# Patient Record
Sex: FEMALE | Race: WHITE | NOT HISPANIC OR LATINO | URBAN - METROPOLITAN AREA
[De-identification: names, ages, dates, MRNs, and addresses within clinical notes are randomized per-mention and may not be internally consistent; named-entity substitution may affect disease eponyms.]

---

## 2017-08-01 ENCOUNTER — TRANSCRIBE ORDERS (OUTPATIENT)
Dept: ENDOCRINOLOGY | Facility: CLINIC | Age: 42
End: 2017-08-01

## 2017-08-01 DIAGNOSIS — E04.9 ENLARGED THYROID GLAND: Primary | ICD-10-CM

## 2018-05-21 ENCOUNTER — OFFICE VISIT (OUTPATIENT)
Dept: ENDOCRINOLOGY | Facility: CLINIC | Age: 43
End: 2018-05-21

## 2018-05-21 VITALS
SYSTOLIC BLOOD PRESSURE: 140 MMHG | HEIGHT: 68 IN | HEART RATE: 68 BPM | BODY MASS INDEX: 24.55 KG/M2 | DIASTOLIC BLOOD PRESSURE: 92 MMHG | WEIGHT: 162 LBS | OXYGEN SATURATION: 99 %

## 2018-05-21 DIAGNOSIS — E06.3 HASHIMOTO'S DISEASE: ICD-10-CM

## 2018-05-21 DIAGNOSIS — E04.1 UNINODULAR GOITER (NONTOXIC): Primary | ICD-10-CM

## 2018-05-21 PROBLEM — E04.9 GOITER: Status: ACTIVE | Noted: 2018-05-21

## 2018-05-21 PROCEDURE — 99244 OFF/OP CNSLTJ NEW/EST MOD 40: CPT | Performed by: INTERNAL MEDICINE

## 2018-05-21 PROCEDURE — 76536 US EXAM OF HEAD AND NECK: CPT | Performed by: INTERNAL MEDICINE

## 2018-05-21 RX ORDER — ETHYNODIOL DIACETATE AND ETHINYL ESTRADIOL 1 MG-35MCG
KIT ORAL
COMMUNITY
Start: 2018-05-02 | End: 2018-05-21

## 2018-05-22 LAB
T4 FREE SERPL-MCNC: 1.18 NG/DL (ref 0.89–1.76)
THYROPEROXIDASE AB SERPL-ACNC: >600 IU/ML (ref 0–34)
TSH SERPL DL<=0.005 MIU/L-ACNC: 3.55 MIU/ML (ref 0.35–5.35)

## 2018-05-24 ENCOUNTER — TELEPHONE (OUTPATIENT)
Dept: ENDOCRINOLOGY | Facility: CLINIC | Age: 43
End: 2018-05-24

## 2018-05-24 NOTE — TELEPHONE ENCOUNTER
Spoke to patient about lab results. See clinic note from 05/21/2018 for details.   Mirlande Weaver MD

## 2018-06-02 PROBLEM — E04.1 UNINODULAR GOITER (NONTOXIC): Status: ACTIVE | Noted: 2018-05-21

## 2018-11-12 ENCOUNTER — OFFICE VISIT (OUTPATIENT)
Dept: ENDOCRINOLOGY | Facility: CLINIC | Age: 43
End: 2018-11-12

## 2018-11-12 VITALS
BODY MASS INDEX: 24.86 KG/M2 | HEART RATE: 75 BPM | DIASTOLIC BLOOD PRESSURE: 94 MMHG | OXYGEN SATURATION: 97 % | HEIGHT: 68 IN | SYSTOLIC BLOOD PRESSURE: 150 MMHG | WEIGHT: 164 LBS

## 2018-11-12 DIAGNOSIS — E06.3 HASHIMOTO'S DISEASE: Primary | ICD-10-CM

## 2018-11-12 DIAGNOSIS — E04.1 UNINODULAR GOITER (NONTOXIC): ICD-10-CM

## 2018-11-12 LAB
T4 FREE SERPL-MCNC: 1.12 NG/DL (ref 0.89–1.76)
TSH SERPL DL<=0.005 MIU/L-ACNC: 3.67 MIU/ML (ref 0.35–5.35)

## 2018-11-12 PROCEDURE — 99213 OFFICE O/P EST LOW 20 MIN: CPT | Performed by: INTERNAL MEDICINE

## 2018-11-12 NOTE — PROGRESS NOTES
"Chief Complaint   Patient presents with   • Uninodular goiter (nontoxic)     f/u        HPI:   Calista Escobar is a 43 y.o.female who returns to [facility]ocrine Clinic for f/u evaluation of her Hashimoto's disease and uninodular goiter. Last visit 05/21/2018.  Her history is as follows:    1) Hashimoto's disease without hypothyroidism as this time:  - labs in May 2018 (TPO Ab > 600) and imaging were consistent early underlying autoimmune thyroid disease (Hashimoto's thyroiditis).   - Pt has not developed overat hypothyroidism at this time, but discussed with patient that she is at risk for developing overt hypothyroidism in the future.     1) uninodular goiter:  - During routine visit for birth control with her gynecologist, pt was found to have a goiter on physical exam. Pt's TSH at that time (06/29/2017) was normal at 1.960 (0.358 - 3.74).  Her gland was further evaluated [facility]. The report described an \"enlarged heterogeneous thyroid gland with two nodules in the right [inferior] lobe.\" A description of the nodules, other than a size of 1.5 cm in one dimension, was not given.    Initial Endocrine Visit: (05/21/2018) Thyroid US performed in clinic showed the thyroid gland was mildly enlarged in size by measured dimensions. The thyroid gland appeared overall slightly hypoechoic with diffusely heterogeneous echotexture. There were multiple small (2-3 mm) cystic lesions scattered throughout the gland; fibrous stranding noted in each lobe; and increased vascularity in each lobe. These US findings represent an early stage of Hashimoto's thyroiditis and correlate with the patient's clinical history. In the right mid to superior lobe, a 1.64(L) x 0.70(AP) x 0.99(T) cm isoechoic, solid nodule was identified. The nodule had a smooth margin, minimal peripheral and intranodal vascularity, and no microcalcifications. Two mildly enlarged lymph nodes in the central compartment, Level VI, " "were identified. The nodes did not have pathologic US features.   RECOMMENDATIONS: The right lobe nodule did not meet criteria for FNA and lacked suspicious US characteristics. Repeat thyroid US recommended to the patient in one year.    On further review, the patient denies a pattern of symptoms consistent with hyper- or hypothyroidism or obstructive goiter. Reports fatigue is no worse. Has poor sleep, wakes up frequently. On average gets  < 6 hrs per night    FMH: no known thyroid cancer, mother has thyroid disease but pt does not know what type.  PMH: Her past medical history is significant for 6 pregnancies with 3 miscarriages.      Review of Systems   Constitutional: Positive for fatigue.   HENT: Negative.    Eyes: Negative.    Respiratory: Negative.    Cardiovascular: Negative.    Gastrointestinal: Negative.  Negative for constipation.   Endocrine: Negative.    Genitourinary: Negative.    Musculoskeletal: Negative.  Negative for arthralgias.   Skin: Negative.    Allergic/Immunologic: Negative.    Neurological: Negative.    Hematological: Negative.    Psychiatric/Behavioral: Positive for sleep disturbance.     The following portions of the patient's history were reviewed and updated as appropriate: allergies, current medications, past family history, past medical history, past social history, past surgical history and problem list.    /94   Pulse 75   Ht 172.7 cm (68\")   Wt 74.4 kg (164 lb)   SpO2 97%   BMI 24.94 kg/m²   Physical Exam   Constitutional: She is oriented to person, place, and time. She appears well-developed. No distress.   HENT:   Head: Normocephalic.   Mouth/Throat: Oropharynx is clear and moist.   Eyes: Conjunctivae and EOM are normal. Pupils are equal, round, and reactive to light.   Neck: No tracheal deviation present. Thyromegaly (mild) present.   No palpable thyroid nodules     Cardiovascular: Normal rate, regular rhythm and normal heart sounds.   No murmur " heard.  Pulmonary/Chest: Effort normal and breath sounds normal. No respiratory distress.   Lymphadenopathy:     She has no cervical adenopathy.   Neurological: She is alert and oriented to person, place, and time. No cranial nerve deficit.   Skin: Skin is warm and dry. She is not diaphoretic. No erythema.   Psychiatric: She has a normal mood and affect. Her behavior is normal.   Vitals reviewed.    LABS/IMAGING: outside records reviewed and summarized in HPI  Results for orders placed or performed in visit on 11/12/18   TSH   Result Value Ref Range    TSH 3.666 0.350 - 5.350 mIU/mL   T4, Free   Result Value Ref Range    Free T4 1.12 0.89 - 1.76 ng/dL       ASSESSMENT/PLAN:  1) Hashimoto's disease without hypothyroidism as this time:  - clinically euthyroid on exam  - labs today show normal thyroid function  - labs today and imaging are consistent early underlying autoimmune thyroid disease (Hashimoto's thyroiditis). Pt has not developed hypothyroidism at this time, but discussed with patient that she is at risk for developing overt hypothyroidism in the future. Therefore, checking a screening TSH periodically is reasonable or sooner if she develops symptoms. Reviewed symptoms of hypothyroidism.    2) uninodular goiter: Thyroid US completed in clinic in 05/2018 (see HPI)   - Repeat thyroid US in clinic scheduled for 05/2019.    Will have pt RTC in 6 months for f/u visit

## 2019-05-20 ENCOUNTER — OFFICE VISIT (OUTPATIENT)
Dept: ENDOCRINOLOGY | Facility: CLINIC | Age: 44
End: 2019-05-20

## 2019-05-20 VITALS
SYSTOLIC BLOOD PRESSURE: 140 MMHG | BODY MASS INDEX: 24.4 KG/M2 | HEART RATE: 63 BPM | WEIGHT: 161 LBS | DIASTOLIC BLOOD PRESSURE: 88 MMHG | HEIGHT: 68 IN | OXYGEN SATURATION: 98 %

## 2019-05-20 DIAGNOSIS — E03.8 HYPOTHYROIDISM DUE TO HASHIMOTO'S THYROIDITIS: ICD-10-CM

## 2019-05-20 DIAGNOSIS — E06.3 HYPOTHYROIDISM DUE TO HASHIMOTO'S THYROIDITIS: ICD-10-CM

## 2019-05-20 DIAGNOSIS — E04.1 UNINODULAR GOITER (NONTOXIC): Primary | ICD-10-CM

## 2019-05-20 PROCEDURE — 99213 OFFICE O/P EST LOW 20 MIN: CPT | Performed by: INTERNAL MEDICINE

## 2019-05-20 PROCEDURE — 76536 US EXAM OF HEAD AND NECK: CPT | Performed by: INTERNAL MEDICINE

## 2019-05-20 RX ORDER — CETIRIZINE HYDROCHLORIDE 10 MG/1
10 TABLET ORAL DAILY
COMMUNITY

## 2019-05-21 LAB
T4 FREE SERPL-MCNC: 0.99 NG/DL (ref 0.93–1.7)
TSH SERPL DL<=0.005 MIU/L-ACNC: 4.53 UIU/ML (ref 0.45–4.5)

## 2019-05-26 ENCOUNTER — TELEPHONE (OUTPATIENT)
Dept: ENDOCRINOLOGY | Facility: CLINIC | Age: 44
End: 2019-05-26

## 2019-05-26 PROBLEM — E03.8 HYPOTHYROIDISM DUE TO HASHIMOTO'S THYROIDITIS: Status: ACTIVE | Noted: 2018-11-12

## 2019-05-26 RX ORDER — LEVOTHYROXINE SODIUM 0.05 MG/1
50 TABLET ORAL DAILY
Qty: 90 TABLET | Refills: 3 | Status: SHIPPED | OUTPATIENT
Start: 2019-05-26 | End: 2020-05-19

## 2019-05-26 NOTE — TELEPHONE ENCOUNTER
Spoke to patient about lab results. See clinic note from 05/20/2019 for details.   Mirlande Weaver MD

## 2020-05-19 DIAGNOSIS — E03.8 HYPOTHYROIDISM DUE TO HASHIMOTO'S THYROIDITIS: ICD-10-CM

## 2020-05-19 DIAGNOSIS — E06.3 HYPOTHYROIDISM DUE TO HASHIMOTO'S THYROIDITIS: ICD-10-CM

## 2020-05-19 RX ORDER — LEVOTHYROXINE SODIUM 0.05 MG/1
TABLET ORAL
Qty: 30 TABLET | Refills: 0 | Status: SHIPPED | OUTPATIENT
Start: 2020-05-19 | End: 2020-06-11 | Stop reason: DRUGHIGH

## 2020-06-01 ENCOUNTER — OFFICE VISIT (OUTPATIENT)
Dept: ENDOCRINOLOGY | Facility: CLINIC | Age: 45
End: 2020-06-01

## 2020-06-01 VITALS
WEIGHT: 161 LBS | HEIGHT: 68 IN | DIASTOLIC BLOOD PRESSURE: 92 MMHG | SYSTOLIC BLOOD PRESSURE: 150 MMHG | BODY MASS INDEX: 24.4 KG/M2 | OXYGEN SATURATION: 99 % | HEART RATE: 72 BPM

## 2020-06-01 DIAGNOSIS — E04.1 UNINODULAR GOITER (NONTOXIC): ICD-10-CM

## 2020-06-01 DIAGNOSIS — E03.8 HYPOTHYROIDISM DUE TO HASHIMOTO'S THYROIDITIS: Primary | ICD-10-CM

## 2020-06-01 DIAGNOSIS — E06.3 HYPOTHYROIDISM DUE TO HASHIMOTO'S THYROIDITIS: Primary | ICD-10-CM

## 2020-06-01 LAB — TSH SERPL DL<=0.005 MIU/L-ACNC: 4.26 UIU/ML (ref 0.27–4.2)

## 2020-06-01 PROCEDURE — 99213 OFFICE O/P EST LOW 20 MIN: CPT | Performed by: INTERNAL MEDICINE

## 2020-06-01 NOTE — PROGRESS NOTES
"Chief Complaint   Patient presents with   • Hypothyroidism due to Hashimotos thyroiditis     f/u   • Uninodular goiter     f/u       HPI:   Calista Escobar is a 44 y.o.female who returns to Endocrine Clinic for f/u evaluation of her Hashimoto's hypothyroidism and uninodular goiter. Last visit 05/20/2019.  Her history is as follows:    Interim Events:   - overall in good health  - reports fatigue did not improve after starting levothyroxine    1) hypothyroidism due to Hashimoto's thyroiditis:  - labs in May 2018 (TPO Ab > 600) and imaging were consistent early underlying autoimmune thyroid disease (Hashimoto's thyroiditis).   - Pt had not developed overt hypothyroidism at that time, but discussed with patient that she is at risk for developing overt hypothyroidism in the future.   - (05/20/2019) TSH 4.530 (0.450 - 4.500): Started levothyroxine 50 mcg    Current Dose: Levothyroxine 50 mcg  -Takes in a.m. on an empty stomach.  At least 30 minutes before food or hot liquids  - Not on a high-dose biotin supplement  - No missed doses    2) uninodular goiter:  - During routine visit for birth control with her gynecologist, pt was found to have a goiter on physical exam. Pt's TSH at that time (06/29/2017) was normal at 1.960 (0.358 - 3.74).  Her gland was further evaluated with Thyroid US completed 07/06/2017 at [facility]. The report described an \"enlarged heterogeneous thyroid gland with two nodules in the right [inferior] lobe.\" A description of the nodules, other than a size of 1.5 cm in one dimension, was not given.    Initial Endocrine Visit: (05/21/2018) Thyroid US performed in clinic showed the thyroid gland was mildly enlarged in size by measured dimensions. The thyroid gland appeared overall slightly hypoechoic with diffusely heterogeneous echotexture. There were multiple small (2-3 mm) cystic lesions scattered throughout the gland; fibrous stranding noted in each lobe; and increased vascularity in each lobe. " These US findings represent an early stage of Hashimoto's thyroiditis and correlate with the patient's clinical history. In the right mid to superior lobe, a 1.64(L) x 0.70(AP) x 0.99(T) cm isoechoic, solid nodule was identified. The nodule had a smooth margin, minimal peripheral and intranodal vascularity, and no microcalcifications. Two mildly enlarged lymph nodes in the central compartment, Level VI, were identified. The nodes did not have pathologic US features.   RECOMMENDATIONS: The right lobe nodule did not meet criteria for FNA and lacked suspicious US characteristics. Repeat thyroid US recommended to the patient in one year.    (5/29/2019) Thyroid ultrasound performed in clinic showed the thyroid gland was mildly enlarged in size by measured dimensions. The thyroid gland appeared overall slightly hypoechoic with diffusely heterogeneous echotexture. There were multiple small (2-3 mm) cystic lesions scattered throughout the gland; fibrous stranding noted in each lobe; and increased vascularity in each lobe. These US findings represent an early stage of Hashimoto's thyroiditis and correlate with the patient's clinical history.  In the right mid to superior lobe, a 1.68(L) x 0.84(AP) x 1.24(T) cm isoechoic, solid nodule was again identified. The nodule had a smooth margin, minimal peripheral and intranodal vascularity, and no microcalcifications..Three mildly enlarged lymph nodes in the central compartment, Level VI, were again identified. The nodes did not have pathologic US features.   RECOMMENDATIONS: The right lobe nodule did not meet criteria for FNA and lacked suspicious US characteristics. Repeat Thyroid US recommended if changes in the gland/neck are noted on physical exam,     FMH: no known thyroid cancer, mother has thyroid disease but pt does not know what type.  PMH: Her past medical history is significant for 6 pregnancies with 3 miscarriages.      Review of Systems   Constitutional: Positive for  "fatigue.   HENT: Negative.    Eyes: Negative.    Respiratory: Negative.    Cardiovascular: Negative.    Gastrointestinal: Negative.  Negative for constipation.   Endocrine: Negative.    Genitourinary: Negative.    Musculoskeletal: Negative.  Negative for arthralgias.   Skin: Negative.    Allergic/Immunologic: Negative.    Neurological: Negative.    Hematological: Negative.    Psychiatric/Behavioral: Negative.  Negative for sleep disturbance.     The following portions of the patient's history were reviewed and updated as appropriate: allergies, current medications, past family history, past medical history, past social history, past surgical history and problem list.    /92   Pulse 72   Ht 172.7 cm (68\")   Wt 73 kg (161 lb)   SpO2 99%   BMI 24.48 kg/m²   Physical Exam   Constitutional: She is oriented to person, place, and time. She appears well-developed. No distress.   HENT:   Head: Normocephalic.   Mouth/Throat: Oropharynx is clear and moist.   Eyes: Pupils are equal, round, and reactive to light. Conjunctivae and EOM are normal.   Neck: No tracheal deviation present. Thyromegaly (mild) present.   No palpable thyroid nodules     Cardiovascular: Normal rate, regular rhythm and normal heart sounds.   No murmur heard.  Pulmonary/Chest: Effort normal and breath sounds normal. No respiratory distress.   Lymphadenopathy:     She has no cervical adenopathy.   Neurological: She is alert and oriented to person, place, and time. No cranial nerve deficit.   Skin: Skin is warm and dry. She is not diaphoretic. No erythema.   Psychiatric: She has a normal mood and affect. Her behavior is normal.   Vitals reviewed.    LABS/IMAGING: outside records reviewed and summarized in HPI  Results for orders placed or performed in visit on 06/01/20   TSH   Result Value Ref Range    TSH 4.260 (H) 0.270 - 4.200 uIU/mL     ASSESSMENT/PLAN:  1) hypothyroidism due to Hashimoto's thyroiditis: uncontrolled  - clinically euthyroid on " exam  - lab today shows an elevated TSH  - will will increase levothyroxine to 88 mcg daily  - Reviewed proper thyroid hormone administration, and factors to avoid that decrease medication potency and medication absorption.   - Will check TFTs in 2 months and adjust dose as indicated    2) uninodular goiter:   - Gland size stable on exam today  - Prior thyroid ultrasound reviewed  - Repeat thyroid ultrasound planned PRN changes in gland on physical exam    Will have pt RTC in 12 months for f/u visit

## 2020-06-11 ENCOUNTER — TELEPHONE (OUTPATIENT)
Dept: ENDOCRINOLOGY | Facility: CLINIC | Age: 45
End: 2020-06-11

## 2020-06-11 DIAGNOSIS — E06.3 HYPOTHYROIDISM DUE TO HASHIMOTO'S THYROIDITIS: Primary | ICD-10-CM

## 2020-06-11 DIAGNOSIS — E03.8 HYPOTHYROIDISM DUE TO HASHIMOTO'S THYROIDITIS: Primary | ICD-10-CM

## 2020-06-11 RX ORDER — LEVOTHYROXINE SODIUM 88 UG/1
88 TABLET ORAL DAILY
Qty: 90 TABLET | Refills: 3 | Status: SHIPPED | OUTPATIENT
Start: 2020-06-11 | End: 2021-06-14

## 2020-06-11 NOTE — TELEPHONE ENCOUNTER
Spoke to patient about lab results. See clinic note from 06/01/2020 for details.   Signed: Mirlande Weaver MD

## 2020-08-03 ENCOUNTER — LAB REQUISITION (OUTPATIENT)
Dept: LAB | Facility: HOSPITAL | Age: 45
End: 2020-08-03

## 2020-08-03 ENCOUNTER — LAB (OUTPATIENT)
Dept: ENDOCRINOLOGY | Facility: CLINIC | Age: 45
End: 2020-08-03

## 2020-08-03 DIAGNOSIS — E03.8 HYPOTHYROIDISM DUE TO HASHIMOTO'S THYROIDITIS: ICD-10-CM

## 2020-08-03 DIAGNOSIS — E06.3 HYPOTHYROIDISM DUE TO HASHIMOTO'S THYROIDITIS: ICD-10-CM

## 2020-08-03 DIAGNOSIS — E06.3 AUTOIMMUNE THYROIDITIS: ICD-10-CM

## 2020-08-03 DIAGNOSIS — E03.8 OTHER SPECIFIED HYPOTHYROIDISM: ICD-10-CM

## 2020-08-03 PROCEDURE — 36415 COLL VENOUS BLD VENIPUNCTURE: CPT | Performed by: INTERNAL MEDICINE

## 2020-08-04 LAB
T4 FREE SERPL-MCNC: 1.51 NG/DL (ref 0.93–1.7)
TSH SERPL DL<=0.005 MIU/L-ACNC: 1.88 UIU/ML (ref 0.27–4.2)

## 2021-06-02 ENCOUNTER — OFFICE VISIT (OUTPATIENT)
Dept: ENDOCRINOLOGY | Facility: CLINIC | Age: 46
End: 2021-06-02

## 2021-06-02 VITALS
OXYGEN SATURATION: 98 % | BODY MASS INDEX: 25.76 KG/M2 | HEART RATE: 70 BPM | SYSTOLIC BLOOD PRESSURE: 124 MMHG | WEIGHT: 170 LBS | DIASTOLIC BLOOD PRESSURE: 70 MMHG | HEIGHT: 68 IN

## 2021-06-02 DIAGNOSIS — E03.8 HYPOTHYROIDISM DUE TO HASHIMOTO'S THYROIDITIS: Primary | ICD-10-CM

## 2021-06-02 DIAGNOSIS — R53.82 CHRONIC FATIGUE: ICD-10-CM

## 2021-06-02 DIAGNOSIS — E06.3 HYPOTHYROIDISM DUE TO HASHIMOTO'S THYROIDITIS: Primary | ICD-10-CM

## 2021-06-02 DIAGNOSIS — E04.1 UNINODULAR GOITER (NONTOXIC): ICD-10-CM

## 2021-06-02 LAB
25(OH)D3+25(OH)D2 SERPL-MCNC: 21.5 NG/ML (ref 30–100)
ERYTHROCYTE [DISTWIDTH] IN BLOOD BY AUTOMATED COUNT: 12.2 % (ref 12.3–15.4)
FERRITIN SERPL-MCNC: 16.2 NG/ML (ref 13–150)
HCT VFR BLD AUTO: 37.2 % (ref 34–46.6)
HGB BLD-MCNC: 12.9 G/DL (ref 12–15.9)
IRON SATN MFR SERPL: 13 % (ref 20–50)
IRON SERPL-MCNC: 67 MCG/DL (ref 37–145)
MCH RBC QN AUTO: 30.6 PG (ref 26.6–33)
MCHC RBC AUTO-ENTMCNC: 34.7 G/DL (ref 31.5–35.7)
MCV RBC AUTO: 88.4 FL (ref 79–97)
PLATELET # BLD AUTO: 323 10*3/MM3 (ref 140–450)
RBC # BLD AUTO: 4.21 10*6/MM3 (ref 3.77–5.28)
TIBC SERPL-MCNC: 516 MCG/DL
TSH SERPL DL<=0.005 MIU/L-ACNC: 1.45 UIU/ML (ref 0.27–4.2)
UIBC SERPL-MCNC: 449 MCG/DL (ref 112–346)
VIT B12 SERPL-MCNC: 221 PG/ML (ref 211–946)
WBC # BLD AUTO: 4.75 10*3/MM3 (ref 3.4–10.8)

## 2021-06-02 PROCEDURE — 99213 OFFICE O/P EST LOW 20 MIN: CPT | Performed by: INTERNAL MEDICINE

## 2021-06-02 NOTE — PROGRESS NOTES
"Chief Complaint   Patient presents with   • Hypothyroidism     follow up       HPI:   Calista Escobar is a 45 y.o.female who returns to Endocrine Clinic for f/u evaluation of her Hashimoto's hypothyroidism and uninodular goiter. Last visit 06/01/2020.  Her history is as follows:    Interim Events:   - overall in good health  - reports chronic fatigue despite adequate sleep  - taking levothyroxine as directed    1) hypothyroidism due to Hashimoto's thyroiditis:  - labs in May 2018 (TPO Ab > 600) and imaging were consistent early underlying autoimmune thyroid disease (Hashimoto's thyroiditis).   - Pt had not developed overt hypothyroidism at that time, but discussed with patient that she is at risk for developing overt hypothyroidism in the future.   - (05/20/2019) TSH 4.530 (0.450 - 4.500): Started levothyroxine 50 mcg. Dose has been titrated    Current Dose: Levothyroxine 88 mcg  -Takes in a.m. on an empty stomach.  At least 30 minutes before food or hot liquids  - Not on a high-dose biotin supplement  - No missed doses    2) uninodular goiter:  - During routine visit for birth control with her gynecologist, pt was found to have a goiter on physical exam. Pt's TSH at that time (06/29/2017) was normal at 1.960 (0.358 - 3.74).  Her gland was further evaluated with Thyroid US completed 07/06/2017 at [facility]. The report described an \"enlarged heterogeneous thyroid gland with two nodules in the right [inferior] lobe.\" A description of the nodules, other than a size of 1.5 cm in one dimension, was not given.    Initial Endocrine Visit: (05/21/2018) Thyroid US performed in clinic showed the thyroid gland was mildly enlarged in size by measured dimensions. The thyroid gland appeared overall slightly hypoechoic with diffusely heterogeneous echotexture. There were multiple small (2-3 mm) cystic lesions scattered throughout the gland; fibrous stranding noted in each lobe; and increased vascularity in each lobe. These US " findings represent an early stage of Hashimoto's thyroiditis and correlate with the patient's clinical history. In the right mid to superior lobe, a 1.64(L) x 0.70(AP) x 0.99(T) cm isoechoic, solid nodule was identified. The nodule had a smooth margin, minimal peripheral and intranodal vascularity, and no microcalcifications. Two mildly enlarged lymph nodes in the central compartment, Level VI, were identified. The nodes did not have pathologic US features.   RECOMMENDATIONS: The right lobe nodule did not meet criteria for FNA and lacked suspicious US characteristics. Repeat thyroid US recommended to the patient in one year.    (5/29/2019) Thyroid ultrasound performed in clinic showed the thyroid gland was mildly enlarged in size by measured dimensions. The thyroid gland appeared overall slightly hypoechoic with diffusely heterogeneous echotexture. There were multiple small (2-3 mm) cystic lesions scattered throughout the gland; fibrous stranding noted in each lobe; and increased vascularity in each lobe. These US findings represent an early stage of Hashimoto's thyroiditis and correlate with the patient's clinical history.  In the right mid to superior lobe, a 1.68(L) x 0.84(AP) x 1.24(T) cm isoechoic, solid nodule was again identified. The nodule had a smooth margin, minimal peripheral and intranodal vascularity, and no microcalcifications..Three mildly enlarged lymph nodes in the central compartment, Level VI, were again identified. The nodes did not have pathologic US features.   RECOMMENDATIONS: The right lobe nodule did not meet criteria for FNA and lacked suspicious US characteristics. Repeat Thyroid US recommended if changes in the gland/neck are noted on physical exam,     FMH: no known thyroid cancer, mother has thyroid disease but pt does not know what type.  PMH: Her past medical history is significant for 6 pregnancies with 3 miscarriages.      Review of Systems   Constitutional: Positive for fatigue.  "  HENT: Negative.    Eyes: Negative.    Respiratory: Negative.    Cardiovascular: Negative.    Gastrointestinal: Negative.  Negative for constipation.   Endocrine: Negative.    Genitourinary: Negative.    Musculoskeletal: Negative.  Negative for arthralgias.   Skin: Negative.    Allergic/Immunologic: Negative.    Neurological: Negative.    Hematological: Negative.    Psychiatric/Behavioral: Negative.  Negative for sleep disturbance.     The following portions of the patient's history were reviewed and updated as appropriate: allergies, current medications, past family history, past medical history, past social history, past surgical history and problem list.    /70   Pulse 70   Ht 172.7 cm (68\")   Wt 77.1 kg (170 lb)   SpO2 98%   BMI 25.85 kg/m²   Physical Exam   Constitutional: She is oriented to person, place, and time. She appears well-developed. No distress.   HENT:   Head: Normocephalic.   Eyes: Pupils are equal, round, and reactive to light. Conjunctivae are normal.   Neck: No tracheal deviation present. Thyromegaly (mild) present.   No palpable thyroid nodules     Cardiovascular: Normal rate, regular rhythm and normal heart sounds.   No murmur heard.  Pulmonary/Chest: Effort normal and breath sounds normal. No respiratory distress.   Lymphadenopathy:     She has no cervical adenopathy.   Neurological: She is alert and oriented to person, place, and time. No cranial nerve deficit.   Skin: Skin is warm and dry. She is not diaphoretic. No erythema.   Psychiatric: Her behavior is normal.   Vitals reviewed.    LABS/IMAGING: outside records reviewed and summarized in HPI  Results for orders placed or performed in visit on 06/02/21   TSH    Specimen: Blood    BLOOD  RELEASE TO ANGELA   Result Value Ref Range    TSH 1.450 0.270 - 4.200 uIU/mL   Vitamin B12    Specimen: Blood    BLOOD  RELEASE TO ANGELA   Result Value Ref Range    Vitamin B-12 221 211 - 946 pg/mL   CBC (No Diff)    Specimen: Blood    BLOOD  " RELEASE TO ANGELA   Result Value Ref Range    WBC 4.75 3.40 - 10.80 10*3/mm3    RBC 4.21 3.77 - 5.28 10*6/mm3    Hemoglobin 12.9 12.0 - 15.9 g/dL    Hematocrit 37.2 34.0 - 46.6 %    MCV 88.4 79.0 - 97.0 fL    MCH 30.6 26.6 - 33.0 pg    MCHC 34.7 31.5 - 35.7 g/dL    RDW 12.2 (L) 12.3 - 15.4 %    Platelets 323 140 - 450 10*3/mm3   Vitamin D 25 Hydroxy    Specimen: Blood    BLOOD  RELEASE TO ANGELA   Result Value Ref Range    25 Hydroxy, Vitamin D 21.5 (L) 30.0 - 100.0 ng/ml   Iron and TIBC    Specimen: Blood    BLOOD  RELEASE TO ANGELA   Result Value Ref Range    TIBC 516 mcg/dL    UIBC 449 (H) 112 - 346 mcg/dL    Iron 67 37 - 145 mcg/dL    Iron Saturation 13 (L) 20 - 50 %   Ferritin    Specimen: Blood    BLOOD  RELEASE TO ANGELA   Result Value Ref Range    Ferritin 16.20 13.00 - 150.00 ng/mL     ASSESSMENT/PLAN:  1) hypothyroidism due to Hashimoto's thyroiditis: uncontrolled  - clinically euthyroid on exam  - TSH today WNL  - continue levothyroxine to 88 mcg daily  - Reviewed proper thyroid hormone administration, and factors to avoid that decrease medication potency and medication absorption.     2) uninodular goiter:   - Gland size stable on exam today  - Prior thyroid ultrasound reviewed  - Repeat thyroid ultrasound planned PRN changes in gland on physical exam    3) chronic fatigue:  Checked vit B12, vit D 25 OH, CBC, iron profile  - Although pt not anemic, her Ferritin and transferrin saturation were low suggestive of iron deficiency  - Vit B12 level was at low end of normal rannge  - Vit D 25 OH (above 20)  Discussed results with pt and reviewed OTC supplements she could try to see if any helped her fatigue.    Will have pt RTC in 12 months for f/u visit    Signed: Mirlande Weaver MD

## 2021-06-13 DIAGNOSIS — E03.8 HYPOTHYROIDISM DUE TO HASHIMOTO'S THYROIDITIS: ICD-10-CM

## 2021-06-13 DIAGNOSIS — E06.3 HYPOTHYROIDISM DUE TO HASHIMOTO'S THYROIDITIS: ICD-10-CM

## 2021-06-16 RX ORDER — LEVOTHYROXINE SODIUM 88 UG/1
TABLET ORAL
Qty: 90 TABLET | Refills: 3 | Status: SHIPPED | OUTPATIENT
Start: 2021-06-16 | End: 2022-06-28 | Stop reason: SDUPTHER

## 2021-06-24 ENCOUNTER — TELEPHONE (OUTPATIENT)
Dept: ENDOCRINOLOGY | Facility: CLINIC | Age: 46
End: 2021-06-24

## 2021-06-24 NOTE — TELEPHONE ENCOUNTER
Spoke to patient about lab results. See clinic note from 06/02/2021 for details.   Signed: Mirlande Weaver MD

## 2022-06-06 ENCOUNTER — OFFICE VISIT (OUTPATIENT)
Dept: ENDOCRINOLOGY | Facility: CLINIC | Age: 47
End: 2022-06-06

## 2022-06-06 ENCOUNTER — LAB (OUTPATIENT)
Dept: LAB | Facility: HOSPITAL | Age: 47
End: 2022-06-06

## 2022-06-06 VITALS
HEART RATE: 72 BPM | BODY MASS INDEX: 25.16 KG/M2 | OXYGEN SATURATION: 98 % | DIASTOLIC BLOOD PRESSURE: 60 MMHG | SYSTOLIC BLOOD PRESSURE: 122 MMHG | WEIGHT: 166 LBS | HEIGHT: 68 IN

## 2022-06-06 DIAGNOSIS — E55.9 VITAMIN D DEFICIENCY: ICD-10-CM

## 2022-06-06 DIAGNOSIS — E04.1 UNINODULAR GOITER (NONTOXIC): ICD-10-CM

## 2022-06-06 DIAGNOSIS — E06.3 HYPOTHYROIDISM DUE TO HASHIMOTO'S THYROIDITIS: Primary | ICD-10-CM

## 2022-06-06 DIAGNOSIS — E03.8 HYPOTHYROIDISM DUE TO HASHIMOTO'S THYROIDITIS: Primary | ICD-10-CM

## 2022-06-06 DIAGNOSIS — E53.8 VITAMIN B12 DEFICIENCY: ICD-10-CM

## 2022-06-06 LAB
25(OH)D3 SERPL-MCNC: 34.6 NG/ML (ref 30–100)
TSH SERPL DL<=0.05 MIU/L-ACNC: 2.43 UIU/ML (ref 0.27–4.2)
VIT B12 BLD-MCNC: 633 PG/ML (ref 211–946)

## 2022-06-06 PROCEDURE — 82607 VITAMIN B-12: CPT | Performed by: INTERNAL MEDICINE

## 2022-06-06 PROCEDURE — 82306 VITAMIN D 25 HYDROXY: CPT | Performed by: INTERNAL MEDICINE

## 2022-06-06 PROCEDURE — 84443 ASSAY THYROID STIM HORMONE: CPT | Performed by: INTERNAL MEDICINE

## 2022-06-06 PROCEDURE — 99213 OFFICE O/P EST LOW 20 MIN: CPT | Performed by: INTERNAL MEDICINE

## 2022-06-06 RX ORDER — CHOLECALCIFEROL (VITAMIN D3) 25 MCG
TABLET,CHEWABLE ORAL
COMMUNITY

## 2022-06-06 NOTE — PROGRESS NOTES
"Chief Complaint   Patient presents with   • Hypothyroidism     Follow uo       HPI:   Calista Escobar is a 46 y.o.female who returns to [facility]ocrine Clinic for f/u evaluation of her Hashimoto's hypothyroidism and uninodular goiter. Last visit 06/02/2021.  Her history is as follows:    Interim Events:   - overall in good health  - reports chronic fatigue despite adequate sleep  - taking levothyroxine as directed    1) hypothyroidism due to Hashimoto's thyroiditis:  - labs in May 2018 (TPO Ab > 600) and imaging were consistent early underlying autoimmune thyroid disease (Hashimoto's thyroiditis).   - Pt had not developed overt hypothyroidism at that time, but discussed with patient that she is at risk for developing overt hypothyroidism in the future.   - (05/20/2019) TSH 4.530 (0.450 - 4.500): Started levothyroxine 50 mcg. Dose has been titrated    Current Dose: Levothyroxine 88 mcg  -Takes in a.m. on an empty stomach.  At least 30 minutes before food or hot liquids  - Not on a high-dose biotin supplement  - No missed doses    2) uninodular goiter:  - During routine visit for birth control with her gynecologist, pt was found to have a goiter on physical exam. Pt's TSH at that time (06/29/2017) was normal at 1.960 (0.358 - 3.74).  Her gland was further evaluated with Thyroid US completed 07/06/2017 at [facility]. The report described an \"enlarged heterogeneous thyroid gland with two nodules in the right [inferior] lobe.\" A description of the nodules, other than a size of 1.5 cm in one dimension, was not given.    Initial Endocrine Visit: (05/21/2018) Thyroid US performed in clinic showed the thyroid gland was mildly enlarged in size by measured dimensions. The thyroid gland appeared overall slightly hypoechoic with diffusely heterogeneous echotexture. There were multiple small (2-3 mm) cystic lesions scattered throughout the gland; fibrous stranding noted in each lobe; and increased vascularity in each lobe. These US " findings represent an early stage of Hashimoto's thyroiditis and correlate with the patient's clinical history. In the right mid to superior lobe, a 1.64(L) x 0.70(AP) x 0.99(T) cm isoechoic, solid nodule was identified. The nodule had a smooth margin, minimal peripheral and intranodal vascularity, and no microcalcifications. Two mildly enlarged lymph nodes in the central compartment, Level VI, were identified. The nodes did not have pathologic US features.   RECOMMENDATIONS: The right lobe nodule did not meet criteria for FNA and lacked suspicious US characteristics. Repeat thyroid US recommended to the patient in one year.    (5/29/2019) Thyroid ultrasound performed in clinic showed the thyroid gland was mildly enlarged in size by measured dimensions. The thyroid gland appeared overall slightly hypoechoic with diffusely heterogeneous echotexture. There were multiple small (2-3 mm) cystic lesions scattered throughout the gland; fibrous stranding noted in each lobe; and increased vascularity in each lobe. These US findings represent an early stage of Hashimoto's thyroiditis and correlate with the patient's clinical history.  In the right mid to superior lobe, a 1.68(L) x 0.84(AP) x 1.24(T) cm isoechoic, solid nodule was again identified. The nodule had a smooth margin, minimal peripheral and intranodal vascularity, and no microcalcifications..Three mildly enlarged lymph nodes in the central compartment, Level VI, were again identified. The nodes did not have pathologic US features.   RECOMMENDATIONS: The right lobe nodule did not meet criteria for FNA and lacked suspicious US characteristics. Repeat Thyroid US recommended if changes in the gland/neck are noted on physical exam,     FMH: no known thyroid cancer, mother has thyroid disease but pt does not know what type.  PMH: Her past medical history is significant for 6 pregnancies with 3 miscarriages.      3) vitamin B12 deficiency, 4) vitamin D deficiency:  -  "found on lab evaluation in 06/2021  - Has been taking OTC Vit B12 1000 mcg cap, Vit D3 1000 IU cap daily    Review of Systems   Constitutional: Positive for fatigue.   HENT: Negative.    Eyes: Negative.    Respiratory: Negative.    Cardiovascular: Negative.    Gastrointestinal: Negative.  Negative for constipation.   Endocrine: Negative.    Genitourinary: Negative.    Musculoskeletal: Negative.  Negative for arthralgias.   Skin: Negative.    Allergic/Immunologic: Negative.    Neurological: Negative.    Hematological: Negative.    Psychiatric/Behavioral: Negative.  Negative for sleep disturbance.     The following portions of the patient's history were reviewed and updated as appropriate: allergies, current medications, past family history, past medical history, past social history, past surgical history and problem list.    /60   Pulse 72   Ht 172.7 cm (68\")   Wt 75.3 kg (166 lb)   SpO2 98%   BMI 25.24 kg/m²   Physical Exam   Constitutional: She is oriented to person, place, and time. She appears well-developed. No distress.   HENT:   Head: Normocephalic.   Eyes: Pupils are equal, round, and reactive to light. Conjunctivae are normal.   Neck: No tracheal deviation present. Thyromegaly (mild) present.   No palpable thyroid nodules     Cardiovascular: Normal rate, regular rhythm and normal heart sounds.   No murmur heard.  Pulmonary/Chest: Effort normal and breath sounds normal. No respiratory distress.   Lymphadenopathy:     She has no cervical adenopathy.   Neurological: She is alert and oriented to person, place, and time. No cranial nerve deficit.   Skin: Skin is warm and dry. She is not diaphoretic. No erythema.   Psychiatric: Her behavior is normal.   Vitals reviewed.    LABS/IMAGING: outside records reviewed and summarized in HPI  Results for orders placed or performed in visit on 06/06/22   TSH    Specimen: Blood   Result Value Ref Range    TSH 2.430 0.270 - 4.200 uIU/mL   Vitamin B12    Specimen: " Blood   Result Value Ref Range    Vitamin B-12 633 211 - 946 pg/mL   Vitamin D 25 Hydroxy    Specimen: Blood   Result Value Ref Range    25 Hydroxy, Vitamin D 34.6 30.0 - 100.0 ng/ml     ASSESSMENT/PLAN:  1) hypothyroidism due to Hashimoto's thyroiditis: controlled  - clinically euthyroid on exam  - TSH today WNL  - continue levothyroxine to 88 mcg daily  - Reviewed proper thyroid hormone administration, and factors to avoid that decrease medication potency and medication absorption.     2) uninodular goiter:   - Gland size stable on exam today  - Prior thyroid ultrasound reviewed  - Repeat thyroid ultrasound planned PRN changes in gland on physical exam    3) vitamin B12 deficiency, 4) vitamin D deficiency:  - found on lab evaluation in 06/2021  - Vit B 12 and vit D levels were WNL  - Continue OTC Vit B12 1000 mcg cap, Vit D3 1000 IU cap daily    Will have pt RTC in 12 months for f/u visit    Signed: Mirlande Weaver MD

## 2022-06-28 DIAGNOSIS — E06.3 HYPOTHYROIDISM DUE TO HASHIMOTO'S THYROIDITIS: ICD-10-CM

## 2022-06-28 DIAGNOSIS — E03.8 HYPOTHYROIDISM DUE TO HASHIMOTO'S THYROIDITIS: ICD-10-CM

## 2022-06-28 RX ORDER — LEVOTHYROXINE SODIUM 88 UG/1
88 TABLET ORAL DAILY
Qty: 90 TABLET | Refills: 3 | Status: SHIPPED | OUTPATIENT
Start: 2022-06-28

## 2023-06-05 ENCOUNTER — OFFICE VISIT (OUTPATIENT)
Dept: ENDOCRINOLOGY | Facility: CLINIC | Age: 48
End: 2023-06-05
Payer: COMMERCIAL

## 2023-06-05 VITALS
HEART RATE: 84 BPM | BODY MASS INDEX: 25.61 KG/M2 | DIASTOLIC BLOOD PRESSURE: 68 MMHG | HEIGHT: 68 IN | WEIGHT: 169 LBS | OXYGEN SATURATION: 98 % | SYSTOLIC BLOOD PRESSURE: 112 MMHG

## 2023-06-05 DIAGNOSIS — E03.8 HYPOTHYROIDISM DUE TO HASHIMOTO'S THYROIDITIS: Primary | ICD-10-CM

## 2023-06-05 DIAGNOSIS — E04.1 UNINODULAR GOITER (NONTOXIC): ICD-10-CM

## 2023-06-05 DIAGNOSIS — E06.3 HYPOTHYROIDISM DUE TO HASHIMOTO'S THYROIDITIS: Primary | ICD-10-CM

## 2023-06-05 LAB
T4 FREE SERPL-MCNC: 1.43 NG/DL (ref 0.93–1.7)
TSH SERPL DL<=0.05 MIU/L-ACNC: 2.39 UIU/ML (ref 0.27–4.2)

## 2023-06-05 PROCEDURE — 84443 ASSAY THYROID STIM HORMONE: CPT | Performed by: INTERNAL MEDICINE

## 2023-06-05 PROCEDURE — 99214 OFFICE O/P EST MOD 30 MIN: CPT | Performed by: INTERNAL MEDICINE

## 2023-06-05 PROCEDURE — 84439 ASSAY OF FREE THYROXINE: CPT | Performed by: INTERNAL MEDICINE

## 2023-06-05 NOTE — PROGRESS NOTES
"Chief Complaint   Patient presents with    Hypothyroidism     Follow up        HPI:   Calista Escobar is a 47 y.o.female who returns to Endocrine Clinic for f/u evaluation of her Hashimoto's hypothyroidism and uninodular goiter. Last visit 06/06/2022.  Her history is as follows:    Interim Events:   - overall in good health  - reports chronic fatigue despite adequate sleep. Fatigue did not improve after starting hiatus from job during summer break.   - taking levothyroxine as directed    1) hypothyroidism due to Hashimoto's thyroiditis:  - labs in May 2018 (TPO Ab > 600) and imaging were consistent early underlying autoimmune thyroid disease (Hashimoto's thyroiditis).   - Pt had not developed overt hypothyroidism at that time, but discussed with patient that she is at risk for developing overt hypothyroidism in the future.   - (05/20/2019) TSH 4.530 (0.450 - 4.500): Started levothyroxine 50 mcg. Dose has been titrated  - pt did not notice difference between Brand Synthroid or generic levothyroxine    Current Dose: Levothyroxine 88 mcg  -Takes in a.m. on an empty stomach.  At least 30 minutes before food or hot liquids  - Not on a high-dose biotin supplement  - No missed doses    2) uninodular goiter:  - During routine visit for birth control with her gynecologist, pt was found to have a goiter on physical exam. Pt's TSH at that time (06/29/2017) was normal at 1.960 (0.358 - 3.74).  Her gland was further evaluated with Thyroid US completed 07/06/2017 at [facility]. The report described an \"enlarged heterogeneous thyroid gland with two nodules in the right [inferior] lobe.\" A description of the nodules, other than a size of 1.5 cm in one dimension, was not given.    Initial Endocrine Visit: (05/21/2018) Thyroid US performed in clinic showed the thyroid gland was mildly enlarged in size by measured dimensions. The thyroid gland appeared overall slightly hypoechoic with diffusely heterogeneous echotexture. There were " multiple small (2-3 mm) cystic lesions scattered throughout the gland; fibrous stranding noted in each lobe; and increased vascularity in each lobe. These US findings represent an early stage of Hashimoto's thyroiditis and correlate with the patient's clinical history. In the right mid to superior lobe, a 1.64(L) x 0.70(AP) x 0.99(T) cm isoechoic, solid nodule was identified. The nodule had a smooth margin, minimal peripheral and intranodal vascularity, and no microcalcifications. Two mildly enlarged lymph nodes in the central compartment, Level VI, were identified. The nodes did not have pathologic US features.   RECOMMENDATIONS: The right lobe nodule did not meet criteria for FNA and lacked suspicious US characteristics. Repeat thyroid US recommended to the patient in one year.    (5/29/2019) Thyroid ultrasound performed in clinic showed the thyroid gland was mildly enlarged in size by measured dimensions. The thyroid gland appeared overall slightly hypoechoic with diffusely heterogeneous echotexture. There were multiple small (2-3 mm) cystic lesions scattered throughout the gland; fibrous stranding noted in each lobe; and increased vascularity in each lobe. These US findings represent an early stage of Hashimoto's thyroiditis and correlate with the patient's clinical history.  In the right mid to superior lobe, a 1.68(L) x 0.84(AP) x 1.24(T) cm isoechoic, solid nodule was again identified. The nodule had a smooth margin, minimal peripheral and intranodal vascularity, and no microcalcifications..Three mildly enlarged lymph nodes in the central compartment, Level VI, were again identified. The nodes did not have pathologic US features.   RECOMMENDATIONS: The right lobe nodule did not meet criteria for FNA and lacked suspicious US characteristics. Repeat Thyroid US recommended if changes in the gland/neck are noted on physical exam,     FMH: no known thyroid cancer, mother has thyroid disease but pt does not know  "what type.  PMH: Her past medical history is significant for 6 pregnancies with 3 miscarriages.      3) vitamin B12 deficiency, 4) vitamin D deficiency:  - found on lab evaluation in 06/2021  - Has been taking OTC Vit B12 1000 mcg cap, Vit D3 1000 IU cap daily    Review of Systems   Constitutional:  Positive for fatigue.   HENT: Negative.     Eyes: Negative.    Respiratory: Negative.     Cardiovascular: Negative.    Gastrointestinal: Negative.  Negative for constipation.   Endocrine: Negative.    Genitourinary: Negative.    Musculoskeletal: Negative.  Negative for arthralgias.   Skin: Negative.    Allergic/Immunologic: Negative.    Neurological: Negative.    Hematological: Negative.    Psychiatric/Behavioral: Negative.  Negative for sleep disturbance.      The following portions of the patient's history were reviewed and updated as appropriate: allergies, current medications, past family history, past medical history, past social history, past surgical history and problem list.    /68   Pulse 84   Ht 172.7 cm (68\")   Wt 76.7 kg (169 lb)   SpO2 98%   BMI 25.70 kg/m²   Physical Exam   Constitutional: She is oriented to person, place, and time. She appears well-developed. No distress.   HENT:   Head: Normocephalic.   Eyes: Pupils are equal, round, and reactive to light. Conjunctivae are normal.   Neck: No tracheal deviation present. Thyromegaly (mild) present.   No palpable thyroid nodules     Cardiovascular: Normal rate, regular rhythm and normal heart sounds.   No murmur heard.  Pulmonary/Chest: Effort normal and breath sounds normal. No respiratory distress.   Lymphadenopathy:     She has no cervical adenopathy.   Neurological: She is alert and oriented to person, place, and time. No cranial nerve deficit.   Skin: Skin is warm and dry. She is not diaphoretic. No erythema.   Psychiatric: Her behavior is normal.   Vitals reviewed.  LABS/IMAGING: outside records reviewed and summarized in HPI  Results for " orders placed or performed in visit on 06/05/23   TSH    Specimen: Arm, Left; Blood   Result Value Ref Range    TSH 2.390 0.270 - 4.200 uIU/mL   T4, Free    Specimen: Arm, Left; Blood   Result Value Ref Range    Free T4 1.43 0.93 - 1.70 ng/dL     ASSESSMENT/PLAN:  1) hypothyroidism due to Hashimoto's thyroiditis:   - clinically euthyroid on exam, however, having persistent fatigue despite adequate sleep  - TFTs today WNL  - continue levothyroxine to 88 mcg qAM. Will have pt try adding liothyronine 5 mcg daily at noon. Reviewed potential side effects. Pt to contact me if not tolerating.   - Will check TFT's in  2 months and adjust dose as indicated   - Reviewed proper thyroid hormone administration, and factors to avoid that decrease medication potency and medication absorption.     2) uninodular goiter:   - Gland size stable on exam today  - Prior thyroid ultrasound reviewed  - Repeat thyroid ultrasound planned PRN changes in gland on physical exam    Will have pt RTC in 12 months for f/u visit    Signed: Mirlande Weaver MD

## 2023-06-06 RX ORDER — LEVOTHYROXINE SODIUM 88 UG/1
88 TABLET ORAL EVERY MORNING
Qty: 90 TABLET | Refills: 3 | Status: SHIPPED | OUTPATIENT
Start: 2023-06-06

## 2023-06-06 RX ORDER — LIOTHYRONINE SODIUM 5 UG/1
TABLET ORAL
Qty: 90 TABLET | Refills: 3 | Status: SHIPPED | OUTPATIENT
Start: 2023-06-06

## 2023-08-03 ENCOUNTER — LAB (OUTPATIENT)
Dept: ENDOCRINOLOGY | Facility: CLINIC | Age: 48
End: 2023-08-03
Payer: COMMERCIAL

## 2023-08-03 DIAGNOSIS — E06.3 HYPOTHYROIDISM DUE TO HASHIMOTO'S THYROIDITIS: ICD-10-CM

## 2023-08-03 DIAGNOSIS — E03.8 HYPOTHYROIDISM DUE TO HASHIMOTO'S THYROIDITIS: ICD-10-CM

## 2023-08-03 LAB
T4 FREE SERPL-MCNC: 1.5 NG/DL (ref 0.93–1.7)
TSH SERPL DL<=0.05 MIU/L-ACNC: 1.16 UIU/ML (ref 0.27–4.2)

## 2023-08-03 PROCEDURE — 84443 ASSAY THYROID STIM HORMONE: CPT | Performed by: INTERNAL MEDICINE

## 2023-08-03 PROCEDURE — 36415 COLL VENOUS BLD VENIPUNCTURE: CPT | Performed by: INTERNAL MEDICINE

## 2023-08-03 PROCEDURE — 84439 ASSAY OF FREE THYROXINE: CPT | Performed by: INTERNAL MEDICINE

## 2024-06-11 ENCOUNTER — OFFICE VISIT (OUTPATIENT)
Dept: ENDOCRINOLOGY | Facility: CLINIC | Age: 49
End: 2024-06-11
Payer: COMMERCIAL

## 2024-06-11 VITALS
WEIGHT: 167.8 LBS | OXYGEN SATURATION: 100 % | HEIGHT: 68 IN | DIASTOLIC BLOOD PRESSURE: 80 MMHG | HEART RATE: 79 BPM | SYSTOLIC BLOOD PRESSURE: 128 MMHG | BODY MASS INDEX: 25.43 KG/M2

## 2024-06-11 DIAGNOSIS — E04.1 UNINODULAR GOITER (NONTOXIC): ICD-10-CM

## 2024-06-11 DIAGNOSIS — E06.3 HYPOTHYROIDISM DUE TO HASHIMOTO'S THYROIDITIS: Primary | ICD-10-CM

## 2024-06-11 DIAGNOSIS — E03.8 HYPOTHYROIDISM DUE TO HASHIMOTO'S THYROIDITIS: Primary | ICD-10-CM

## 2024-06-11 LAB
T4 FREE SERPL-MCNC: 1.46 NG/DL (ref 0.92–1.68)
TSH SERPL DL<=0.05 MIU/L-ACNC: 1.87 UIU/ML (ref 0.27–4.2)

## 2024-06-11 PROCEDURE — 84439 ASSAY OF FREE THYROXINE: CPT | Performed by: INTERNAL MEDICINE

## 2024-06-11 PROCEDURE — 84443 ASSAY THYROID STIM HORMONE: CPT | Performed by: INTERNAL MEDICINE

## 2024-06-11 NOTE — PROGRESS NOTES
"Chief Complaint   Patient presents with    Hypothyroidism       HPI:   Calista Escobar is a 48 y.o.female who returns to Endocrine Clinic for f/u evaluation of her Hashimoto's hypothyroidism and uninodular goiter. Last visit 06/05/2023.  Her history is as follows:    Interim Events:   -Patient reports overall feeling well.  She is not clear if the addition of liothyronine helped her clinically.  She also started a new job around the time that the liothyronine was added, so it is not clear if the T3 made a difference clinically.    1) hypothyroidism due to Hashimoto's thyroiditis:  - labs in May 2018 (TPO Ab > 600) and imaging were consistent early underlying autoimmune thyroid disease (Hashimoto's thyroiditis).   - Pt had not developed overt hypothyroidism at that time, but discussed with patient that she is at risk for developing overt hypothyroidism in the future.   - (05/20/2019) TSH 4.530 (0.450 - 4.500): Started levothyroxine 50 mcg. Dose has been titrated  - pt did not notice difference between Brand Synthroid or generic levothyroxine  - (06/2023) Added liothyronine 5 mcg     Current Dose: Levothyroxine 88 mcg qAM + liothyronine 5 mcg around 10:30 am  -Takes on an empty stomach.  At least 30 minutes before food or hot liquids  - Not on a high-dose biotin supplement  - No missed doses    -Patient reports overall feeling well.  She is not clear if the addition of liothyronine helped her clinically.  She also started a new job around the time that the liothyronine was added, so it is not clear if the T3 made a difference clinically.    2) uninodular goiter:  - During routine visit for birth control with her gynecologist, pt was found to have a goiter on physical exam. Pt's TSH at that time (06/29/2017) was normal at 1.960 (0.358 - 3.74).  Her gland was further evaluated with Thyroid US completed 07/06/2017 at [facility]. The report described an \"enlarged heterogeneous thyroid gland with two nodules in the right " "[inferior] lobe.\" A description of the nodules, other than a size of 1.5 cm in one dimension, was not given.    Initial Endocrine Visit: (05/21/2018) Thyroid US performed in clinic showed the thyroid gland was mildly enlarged in size by measured dimensions. The thyroid gland appeared overall slightly hypoechoic with diffusely heterogeneous echotexture. There were multiple small (2-3 mm) cystic lesions scattered throughout the gland; fibrous stranding noted in each lobe; and increased vascularity in each lobe. These US findings represent an early stage of Hashimoto's thyroiditis and correlate with the patient's clinical history. In the right mid to superior lobe, a 1.64(L) x 0.70(AP) x 0.99(T) cm isoechoic, solid nodule was identified. The nodule had a smooth margin, minimal peripheral and intranodal vascularity, and no microcalcifications. Two mildly enlarged lymph nodes in the central compartment, Level VI, were identified. The nodes did not have pathologic US features.   RECOMMENDATIONS: The right lobe nodule did not meet criteria for FNA and lacked suspicious US characteristics. Repeat thyroid US recommended to the patient in one year.    (5/29/2019) Thyroid ultrasound performed in clinic showed the thyroid gland was mildly enlarged in size by measured dimensions. The thyroid gland appeared overall slightly hypoechoic with diffusely heterogeneous echotexture. There were multiple small (2-3 mm) cystic lesions scattered throughout the gland; fibrous stranding noted in each lobe; and increased vascularity in each lobe. These US findings represent an early stage of Hashimoto's thyroiditis and correlate with the patient's clinical history.  In the right mid to superior lobe, a 1.68(L) x 0.84(AP) x 1.24(T) cm isoechoic, solid nodule was again identified. The nodule had a smooth margin, minimal peripheral and intranodal vascularity, and no microcalcifications..Three mildly enlarged lymph nodes in the central " "compartment, Level VI, were again identified. The nodes did not have pathologic US features.   RECOMMENDATIONS: The right lobe nodule did not meet criteria for FNA and lacked suspicious US characteristics. Repeat Thyroid US recommended if changes in the gland/neck are noted on physical exam,     FMH: no known thyroid cancer, mother has thyroid disease but pt does not know what type.  PMH: Her past medical history is significant for 6 pregnancies with 3 miscarriages.      3) vitamin B12 deficiency, 4) vitamin D deficiency:  - found on lab evaluation in 06/2021  - Has been taking OTC Vit B12 1000 mcg cap, Vit D3 1000 IU cap daily    Review of Systems   Constitutional:  Negative for fatigue.        Wt stable   HENT: Negative.     Eyes: Negative.    Respiratory: Negative.     Cardiovascular: Negative.    Gastrointestinal: Negative.  Negative for constipation.   Endocrine: Negative.    Genitourinary: Negative.    Musculoskeletal: Negative.  Negative for arthralgias.   Skin: Negative.    Allergic/Immunologic: Negative.    Neurological: Negative.    Hematological: Negative.    Psychiatric/Behavioral: Negative.  Negative for sleep disturbance.      The following portions of the patient's history were reviewed and updated as appropriate: allergies, current medications, past family history, past medical history, past social history, past surgical history and problem list.    /80   Pulse 79   Ht 172.7 cm (67.99\")   Wt 76.1 kg (167 lb 12.8 oz)   SpO2 100%   BMI 25.52 kg/m²   Physical Exam   Constitutional: She is oriented to person, place, and time. She appears well-developed. No distress.   HENT:   Head: Normocephalic.   Eyes: Pupils are equal, round, and reactive to light. Conjunctivae are normal.   Neck: No tracheal deviation present. Thyromegaly (mild) present.   No palpable thyroid nodules     Cardiovascular: Normal rate, regular rhythm and normal heart sounds.   No murmur heard.  Pulmonary/Chest: Effort normal " and breath sounds normal. No respiratory distress.   Lymphadenopathy:     She has no cervical adenopathy.   Neurological: She is alert and oriented to person, place, and time. No cranial nerve deficit.   Skin: Skin is warm and dry. She is not diaphoretic. No erythema.   Psychiatric: Her behavior is normal.   Vitals reviewed.    LABS/IMAGING: outside records reviewed and summarized in HPI  Results for orders placed or performed in visit on 06/11/24   TSH    Specimen: Arm, Left; Blood   Result Value Ref Range    TSH 1.870 0.270 - 4.200 uIU/mL   T4, Free    Specimen: Arm, Left; Blood   Result Value Ref Range    Free T4 1.46 0.92 - 1.68 ng/dL     ASSESSMENT/PLAN:  1) hypothyroidism due to Hashimoto's thyroiditis:   - clinically euthyroid on exam, however, having persistent fatigue despite adequate sleep  - TFTs today WNL  - continue levothyroxine to 88 mcg qAM + liothyronine 5 mcg. Reviewed potential side effects. Pt to contact me if the addition of T3 is no longer beneficial  - Reviewed proper thyroid hormone administration, and factors to avoid that decrease medication potency and medication absorption.     2) uninodular goiter:   - Gland size stable on exam today  - Prior thyroid ultrasound reviewed  - Repeat thyroid ultrasound planned PRN changes in gland on physical exam    Will have pt RTC in 12 months for f/u visit. Patient to call to have labs checked sooner than follow-up if she develops any concerning symptoms    Electronically Signed: Mirlande Weaver MD

## 2024-06-28 DIAGNOSIS — E06.3 HYPOTHYROIDISM DUE TO HASHIMOTO'S THYROIDITIS: ICD-10-CM

## 2024-06-28 DIAGNOSIS — E03.8 HYPOTHYROIDISM DUE TO HASHIMOTO'S THYROIDITIS: ICD-10-CM

## 2024-06-28 RX ORDER — LIOTHYRONINE SODIUM 5 UG/1
TABLET ORAL
Qty: 90 TABLET | Refills: 3 | Status: SHIPPED | OUTPATIENT
Start: 2024-06-28

## 2024-06-28 RX ORDER — LEVOTHYROXINE SODIUM 88 UG/1
88 TABLET ORAL EVERY MORNING
Qty: 90 TABLET | Refills: 3 | Status: SHIPPED | OUTPATIENT
Start: 2024-06-28

## 2024-06-28 NOTE — TELEPHONE ENCOUNTER
Rx Refill Note    Requested Prescriptions     Pending Prescriptions Disp Refills    levothyroxine (SYNTHROID, LEVOTHROID) 88 MCG tablet [Pharmacy Med Name: LEVOTHYROXINE 88 MCG TABLET] 90 tablet 3     Sig: TAKE ONE TABLET BY MOUTH EVERY MORNING    liothyronine (CYTOMEL) 5 MCG tablet [Pharmacy Med Name: LIOTHYRONINE SOD 5 MCG TAB] 90 tablet 3     Sig: TAKE 1 TABLET BY MOUTH DAILY AT NOON        Last office visit with prescribing clinician: 6/11/2024       Next office visit with prescribing clinician: 6/16/2025

## 2025-06-16 ENCOUNTER — OFFICE VISIT (OUTPATIENT)
Dept: ENDOCRINOLOGY | Facility: CLINIC | Age: 50
End: 2025-06-16
Payer: COMMERCIAL

## 2025-06-16 DIAGNOSIS — E04.1 UNINODULAR GOITER (NONTOXIC): ICD-10-CM

## 2025-06-16 DIAGNOSIS — E06.3 HYPOTHYROIDISM DUE TO HASHIMOTO'S THYROIDITIS: Primary | ICD-10-CM

## 2025-06-16 LAB
T4 FREE SERPL-MCNC: 1.2 NG/DL (ref 0.92–1.68)
TSH SERPL DL<=0.05 MIU/L-ACNC: 1.65 UIU/ML (ref 0.27–4.2)

## 2025-06-16 PROCEDURE — 84443 ASSAY THYROID STIM HORMONE: CPT | Performed by: INTERNAL MEDICINE

## 2025-06-16 PROCEDURE — 99214 OFFICE O/P EST MOD 30 MIN: CPT | Performed by: INTERNAL MEDICINE

## 2025-06-16 PROCEDURE — 84439 ASSAY OF FREE THYROXINE: CPT | Performed by: INTERNAL MEDICINE

## 2025-06-16 RX ORDER — MAGNESIUM OXIDE 400 MG/1
200 TABLET ORAL DAILY
COMMUNITY

## 2025-06-16 NOTE — PROGRESS NOTES
"Chief Complaint   Patient presents with    Hypothyroidism     Follow up       HPI:   Calista Escobar is a 49 y.o.female who returns to [facility]ocrine Clinic for f/u evaluation of her Hashimoto's hypothyroidism and uninodular goiter. Last visit 06/11/2024.  Her history is as follows:    Interim Events:   -Patient reports afternoon fatigue.  She is not clear if the addition of liothyronine helped her clinically.  Changing the timing of the liothyronine did not make a noticeable difference.   - Is taking Mg glycinate nightly to help with sleep    1) hypothyroidism due to Hashimoto's thyroiditis:  - labs in May 2018 (TPO Ab > 600) and imaging were consistent early underlying autoimmune thyroid disease (Hashimoto's thyroiditis).   - Pt had not developed overt hypothyroidism at that time, but discussed with patient that she is at risk for developing overt hypothyroidism in the future.   - (05/20/2019) TSH 4.530 (0.450 - 4.500): Started levothyroxine 50 mcg. Dose has been titrated  - pt did not notice difference between Brand Synthroid or generic levothyroxine  - (06/2023) Added liothyronine 5 mcg     Current Dose: Levothyroxine 88 mcg qAM + liothyronine 5 mcg around noon  -Takes on an empty stomach.  At least 30 minutes before food or hot liquids  - Not on a high-dose biotin supplement  - No missed doses      2) uninodular goiter:  - During routine visit for birth control with her gynecologist, pt was found to have a goiter on physical exam. Pt's TSH at that time (06/29/2017) was normal at 1.960 (0.358 - 3.74).  Her gland was further evaluated with Thyroid US completed 07/06/2017 at [facility]. The report described an \"enlarged heterogeneous thyroid gland with two nodules in the right [inferior] lobe.\" A description of the nodules, other than a size of 1.5 cm in one dimension, was not given.    Initial Endocrine Visit: (05/21/2018) Thyroid US performed in clinic showed the thyroid gland was mildly enlarged in size by " measured dimensions. The thyroid gland appeared overall slightly hypoechoic with diffusely heterogeneous echotexture. There were multiple small (2-3 mm) cystic lesions scattered throughout the gland; fibrous stranding noted in each lobe; and increased vascularity in each lobe. These US findings represent an early stage of Hashimoto's thyroiditis and correlate with the patient's clinical history. In the right mid to superior lobe, a 1.64(L) x 0.70(AP) x 0.99(T) cm isoechoic, solid nodule was identified. The nodule had a smooth margin, minimal peripheral and intranodal vascularity, and no microcalcifications. Two mildly enlarged lymph nodes in the central compartment, Level VI, were identified. The nodes did not have pathologic US features.   RECOMMENDATIONS: The right lobe nodule did not meet criteria for FNA and lacked suspicious US characteristics. Repeat thyroid US recommended to the patient in one year.    (5/29/2019) Thyroid ultrasound performed in clinic showed the thyroid gland was mildly enlarged in size by measured dimensions. The thyroid gland appeared overall slightly hypoechoic with diffusely heterogeneous echotexture. There were multiple small (2-3 mm) cystic lesions scattered throughout the gland; fibrous stranding noted in each lobe; and increased vascularity in each lobe. These US findings represent an early stage of Hashimoto's thyroiditis and correlate with the patient's clinical history.  In the right mid to superior lobe, a 1.68(L) x 0.84(AP) x 1.24(T) cm isoechoic, solid nodule was again identified. The nodule had a smooth margin, minimal peripheral and intranodal vascularity, and no microcalcifications..Three mildly enlarged lymph nodes in the central compartment, Level VI, were again identified. The nodes did not have pathologic US features.   RECOMMENDATIONS: The right lobe nodule did not meet criteria for FNA and lacked suspicious US characteristics. Repeat Thyroid US recommended if changes  "in the gland/neck are noted on physical exam,     FMH: no known thyroid cancer, mother has thyroid disease but pt does not know what type.  PMH: Her past medical history is significant for 6 pregnancies with 3 miscarriages.      3) vitamin B12 deficiency, 4) vitamin D deficiency:  - found on lab evaluation in 06/2021  - Has been taking OTC Vit B12 1000 mcg cap, Vit D3 1000 IU cap daily    Review of Systems   Constitutional:  Positive for fatigue (afternoon).        Mild weight gain since last year   HENT: Negative.     Eyes: Negative.    Respiratory: Negative.     Cardiovascular: Negative.    Gastrointestinal: Negative.  Negative for constipation.   Endocrine: Negative.    Genitourinary: Negative.    Musculoskeletal: Negative.  Negative for arthralgias.   Skin: Negative.    Allergic/Immunologic: Negative.    Neurological: Negative.    Hematological: Negative.    Psychiatric/Behavioral: Negative.  Negative for sleep disturbance.      The following portions of the patient's history were reviewed and updated as appropriate: allergies, current medications, past family history, past medical history, past social history, past surgical history and problem list.    /76 (BP Location: Right arm, Patient Position: Sitting, Cuff Size: Adult)   Pulse 72   Ht 68 cm (26.77\")   Wt 81.1 kg (178 lb 12.8 oz)   SpO2 99%   .44 kg/m²   Physical Exam   Constitutional: She is oriented to person, place, and time. She appears well-developed. No distress.   HENT:   Head: Normocephalic.   Eyes: Pupils are equal, round, and reactive to light. Conjunctivae are normal.   Neck: No tracheal deviation present. Thyromegaly (mild) present.   No palpable thyroid nodules     Cardiovascular: Normal rate, regular rhythm and normal heart sounds.   No murmur heard.  Pulmonary/Chest: Effort normal and breath sounds normal. No respiratory distress.   Lymphadenopathy:     She has no cervical adenopathy.   Neurological: She is alert and " oriented to person, place, and time. No cranial nerve deficit.   Skin: Skin is warm and dry. She is not diaphoretic. No erythema.   Psychiatric: Her behavior is normal.   Vitals reviewed.    LABS/IMAGING: outside records reviewed and summarized in HPI  Results for orders placed or performed in visit on 06/16/25   TSH    Collection Time: 06/16/25  9:26 AM    Specimen: Arm, Left; Blood   Result Value Ref Range    TSH 1.650 0.270 - 4.200 uIU/mL   T4, Free    Collection Time: 06/16/25  9:26 AM    Specimen: Arm, Left; Blood   Result Value Ref Range    Free T4 1.20 0.92 - 1.68 ng/dL     ASSESSMENT/PLAN:  1) hypothyroidism due to Hashimoto's thyroiditis:   - clinically euthyroid on exam, however, having persistent fatigue despite adequate sleep  - TFTs today WNL on  levothyroxine to 88 mcg qAM + liothyronine 5 mcg.   - Discussed treatment options.  We did not have samples of NP thyroid 30 mg tablets.  Patient was given samples of NP thyroid 60 mg and advised to take 6 out of 7 days/week.  To contact me in approximately 4 to 6 weeks regarding how she feels on the NP thyroid formulation in comparison to her current thyroid hormone regimen    - Reviewed proper thyroid hormone administration, and factors to avoid that decrease medication potency and medication absorption.     2) uninodular goiter:   - Gland size stable on exam today  - Prior thyroid ultrasound reviewed  - Repeat thyroid ultrasound planned PRN changes in gland on physical exam    Will have pt RTC in 12 months for f/u visit. Patient to call to have labs checked sooner than follow-up if she develops any concerning symptoms    Electronically Signed: Mirlande Weaver MD

## 2025-06-22 DIAGNOSIS — E06.3 HYPOTHYROIDISM DUE TO HASHIMOTO'S THYROIDITIS: ICD-10-CM

## 2025-06-23 RX ORDER — LIOTHYRONINE SODIUM 5 UG/1
TABLET ORAL
Qty: 90 TABLET | Refills: 3 | Status: CANCELLED | OUTPATIENT
Start: 2025-06-23

## 2025-06-23 NOTE — TELEPHONE ENCOUNTER
Rx Refill Note  Requested Prescriptions     Pending Prescriptions Disp Refills    levothyroxine (SYNTHROID, LEVOTHROID) 88 MCG tablet [Pharmacy Med Name: LEVOTHYROXINE 88 MCG TABLET] 90 tablet 3     Sig: TAKE ONE TABLET BY MOUTH EVERY MORNING    liothyronine (CYTOMEL) 5 MCG tablet 90 tablet 3     Sig: TAKE 1 TABLET BY MOUTH DAILY AT NOON      Last office visit with prescribing clinician: 6/16/2025      Next office visit with prescribing clinician: 6/8/2026                  Joi Marks MA  06/23/25, 11:49 EDT

## 2025-06-25 RX ORDER — LEVOTHYROXINE SODIUM 88 UG/1
88 TABLET ORAL EVERY MORNING
Qty: 90 TABLET | Refills: 3 | Status: SHIPPED | OUTPATIENT
Start: 2025-06-25

## 2025-07-31 DIAGNOSIS — E06.3 HYPOTHYROIDISM DUE TO HASHIMOTO'S THYROIDITIS: Primary | ICD-10-CM

## 2025-07-31 RX ORDER — LEVOTHYROXINE, LIOTHYRONINE 19; 4.5 UG/1; UG/1
30 TABLET ORAL DAILY
Qty: 30 TABLET | Refills: 1 | Status: SHIPPED | OUTPATIENT
Start: 2025-07-31

## 2025-08-01 ENCOUNTER — PRIOR AUTHORIZATION (OUTPATIENT)
Dept: ENDOCRINOLOGY | Facility: CLINIC | Age: 50
End: 2025-08-01
Payer: COMMERCIAL

## 2025-08-01 NOTE — TELEPHONE ENCOUNTER
CHICA DERICKNOREENKARLA (Key: [id number])  PA Case ID #: [id number]  Rx #: [id number]  Need Help? Call us at [phone]  Outcome  Approved today by [facility] 2017  Your PA request has been approved. Additional information will be provided in the approval communication. (Message [phone])  Effective Date: 8/1/2025  Authorization Expiration Date: 8/1/2026  Drug  NP Thyroid 30MG tablets  ePA cloud logo  Form  [employer] Electronic PA Form (2017 [employer])  Original Claim Info  70,MR USE GENERIC LEVOTHYROXINE OR EXCTRY GENERICS FIRST OR CALL [id number].NON-FORMULARY DRUG, CONTACT PRESCRIBER

## 2025-08-26 DIAGNOSIS — E06.3 HYPOTHYROIDISM DUE TO HASHIMOTO'S THYROIDITIS: Primary | ICD-10-CM

## 2025-08-26 RX ORDER — LIOTHYRONINE SODIUM 5 UG/1
TABLET ORAL
Qty: 90 TABLET | Refills: 3 | Status: SHIPPED | OUTPATIENT
Start: 2025-08-26

## 2025-08-26 RX ORDER — LEVOTHYROXINE SODIUM 88 UG/1
88 TABLET ORAL EVERY MORNING
Qty: 90 TABLET | Refills: 3 | Status: SHIPPED | OUTPATIENT
Start: 2025-08-26